# Patient Record
Sex: FEMALE | Race: WHITE | NOT HISPANIC OR LATINO | URBAN - METROPOLITAN AREA
[De-identification: names, ages, dates, MRNs, and addresses within clinical notes are randomized per-mention and may not be internally consistent; named-entity substitution may affect disease eponyms.]

---

## 2018-03-27 ENCOUNTER — OUTPATIENT (OUTPATIENT)
Dept: OUTPATIENT SERVICES | Facility: HOSPITAL | Age: 23
LOS: 1 days | End: 2018-03-27
Payer: COMMERCIAL

## 2018-03-27 DIAGNOSIS — Z3A.00 WEEKS OF GESTATION OF PREGNANCY NOT SPECIFIED: ICD-10-CM

## 2018-03-27 DIAGNOSIS — O26.899 OTHER SPECIFIED PREGNANCY RELATED CONDITIONS, UNSPECIFIED TRIMESTER: ICD-10-CM

## 2018-03-28 ENCOUNTER — OUTPATIENT (OUTPATIENT)
Dept: OUTPATIENT SERVICES | Facility: HOSPITAL | Age: 23
LOS: 1 days | End: 2018-03-28
Payer: COMMERCIAL

## 2018-03-28 DIAGNOSIS — O26.899 OTHER SPECIFIED PREGNANCY RELATED CONDITIONS, UNSPECIFIED TRIMESTER: ICD-10-CM

## 2018-03-28 DIAGNOSIS — Z3A.00 WEEKS OF GESTATION OF PREGNANCY NOT SPECIFIED: ICD-10-CM

## 2018-03-28 LAB — AMNISURE ROM (RUPTURE OF MEMBRANES): NEGATIVE — SIGNIFICANT CHANGE UP

## 2018-03-28 PROCEDURE — 84112 EVAL AMNIOTIC FLUID PROTEIN: CPT

## 2018-03-28 PROCEDURE — G0463: CPT

## 2018-03-28 PROCEDURE — 59025 FETAL NON-STRESS TEST: CPT

## 2018-04-09 PROBLEM — Z00.00 ENCOUNTER FOR PREVENTIVE HEALTH EXAMINATION: Status: ACTIVE | Noted: 2018-04-09

## 2018-04-15 ENCOUNTER — INPATIENT (INPATIENT)
Facility: HOSPITAL | Age: 23
LOS: 2 days | Discharge: HOME | End: 2018-04-18
Attending: OBSTETRICS & GYNECOLOGY | Admitting: OBSTETRICS & GYNECOLOGY
Payer: COMMERCIAL

## 2018-04-15 VITALS — WEIGHT: 123.46 LBS | HEIGHT: 65 IN

## 2018-04-15 DIAGNOSIS — Z34.80 ENCOUNTER FOR SUPERVISION OF OTHER NORMAL PREGNANCY, UNSPECIFIED TRIMESTER: ICD-10-CM

## 2018-04-15 DIAGNOSIS — O26.899 OTHER SPECIFIED PREGNANCY RELATED CONDITIONS, UNSPECIFIED TRIMESTER: ICD-10-CM

## 2018-04-15 DIAGNOSIS — Z3A.00 WEEKS OF GESTATION OF PREGNANCY NOT SPECIFIED: ICD-10-CM

## 2018-04-15 LAB
BASOPHILS # BLD AUTO: 0.1 K/UL — SIGNIFICANT CHANGE UP (ref 0–0.2)
BASOPHILS NFR BLD AUTO: 0.6 % — SIGNIFICANT CHANGE UP (ref 0–2)
BLD GP AB SCN SERPL QL: NEGATIVE — SIGNIFICANT CHANGE UP
EOSINOPHIL # BLD AUTO: 0.1 K/UL — SIGNIFICANT CHANGE UP (ref 0–0.5)
EOSINOPHIL NFR BLD AUTO: 0.9 % — SIGNIFICANT CHANGE UP (ref 0–6)
HCT VFR BLD CALC: 38.4 % — SIGNIFICANT CHANGE UP (ref 34.5–45)
HGB BLD-MCNC: 13.1 G/DL — SIGNIFICANT CHANGE UP (ref 11.5–15.5)
LYMPHOCYTES # BLD AUTO: 2.6 K/UL — SIGNIFICANT CHANGE UP (ref 1–3.3)
LYMPHOCYTES # BLD AUTO: 20.7 % — SIGNIFICANT CHANGE UP (ref 13–44)
MCHC RBC-ENTMCNC: 31 PG — SIGNIFICANT CHANGE UP (ref 27–34)
MCHC RBC-ENTMCNC: 34.1 GM/DL — SIGNIFICANT CHANGE UP (ref 32–36)
MCV RBC AUTO: 90.8 FL — SIGNIFICANT CHANGE UP (ref 80–100)
MONOCYTES # BLD AUTO: 1 K/UL — HIGH (ref 0–0.9)
MONOCYTES NFR BLD AUTO: 8 % — SIGNIFICANT CHANGE UP (ref 2–14)
NEUTROPHILS # BLD AUTO: 8.7 K/UL — HIGH (ref 1.8–7.4)
NEUTROPHILS NFR BLD AUTO: 69.9 % — SIGNIFICANT CHANGE UP (ref 43–77)
PLATELET # BLD AUTO: 328 K/UL — SIGNIFICANT CHANGE UP (ref 150–400)
RBC # BLD: 4.23 M/UL — SIGNIFICANT CHANGE UP (ref 3.8–5.2)
RBC # FLD: 11.7 % — SIGNIFICANT CHANGE UP (ref 10.3–14.5)
RH IG SCN BLD-IMP: POSITIVE — SIGNIFICANT CHANGE UP
WBC # BLD: 12.5 K/UL — HIGH (ref 3.8–10.5)
WBC # FLD AUTO: 12.5 K/UL — HIGH (ref 3.8–10.5)

## 2018-04-15 RX ORDER — SODIUM CHLORIDE 9 MG/ML
500 INJECTION, SOLUTION INTRAVENOUS ONCE
Qty: 0 | Refills: 0 | Status: DISCONTINUED | OUTPATIENT
Start: 2018-04-15 | End: 2018-04-16

## 2018-04-15 RX ORDER — OXYTOCIN 10 UNIT/ML
333.33 VIAL (ML) INJECTION
Qty: 20 | Refills: 0 | Status: DISCONTINUED | OUTPATIENT
Start: 2018-04-15 | End: 2018-04-16

## 2018-04-15 RX ORDER — SODIUM CHLORIDE 9 MG/ML
1000 INJECTION, SOLUTION INTRAVENOUS
Qty: 0 | Refills: 0 | Status: DISCONTINUED | OUTPATIENT
Start: 2018-04-15 | End: 2018-04-16

## 2018-04-15 RX ORDER — OXYTOCIN 10 UNIT/ML
4 VIAL (ML) INJECTION
Qty: 30 | Refills: 0 | Status: DISCONTINUED | OUTPATIENT
Start: 2018-04-15 | End: 2018-04-16

## 2018-04-15 RX ORDER — CITRIC ACID/SODIUM CITRATE 300-500 MG
15 SOLUTION, ORAL ORAL EVERY 4 HOURS
Qty: 0 | Refills: 0 | Status: DISCONTINUED | OUTPATIENT
Start: 2018-04-15 | End: 2018-04-16

## 2018-04-16 LAB — T PALLIDUM AB TITR SER: NEGATIVE — SIGNIFICANT CHANGE UP

## 2018-04-16 RX ORDER — KETOROLAC TROMETHAMINE 30 MG/ML
30 SYRINGE (ML) INJECTION ONCE
Qty: 0 | Refills: 0 | Status: DISCONTINUED | OUTPATIENT
Start: 2018-04-16 | End: 2018-04-16

## 2018-04-16 RX ORDER — DIBUCAINE 1 %
1 OINTMENT (GRAM) RECTAL EVERY 4 HOURS
Qty: 0 | Refills: 0 | Status: DISCONTINUED | OUTPATIENT
Start: 2018-04-16 | End: 2018-04-18

## 2018-04-16 RX ORDER — SODIUM CHLORIDE 9 MG/ML
3 INJECTION INTRAMUSCULAR; INTRAVENOUS; SUBCUTANEOUS EVERY 8 HOURS
Qty: 0 | Refills: 0 | Status: DISCONTINUED | OUTPATIENT
Start: 2018-04-16 | End: 2018-04-18

## 2018-04-16 RX ORDER — HYDROCORTISONE 1 %
1 OINTMENT (GRAM) TOPICAL EVERY 4 HOURS
Qty: 0 | Refills: 0 | Status: DISCONTINUED | OUTPATIENT
Start: 2018-04-16 | End: 2018-04-18

## 2018-04-16 RX ORDER — OXYCODONE HYDROCHLORIDE 5 MG/1
5 TABLET ORAL
Qty: 0 | Refills: 0 | Status: DISCONTINUED | OUTPATIENT
Start: 2018-04-16 | End: 2018-04-18

## 2018-04-16 RX ORDER — OXYTOCIN 10 UNIT/ML
41.67 VIAL (ML) INJECTION
Qty: 20 | Refills: 0 | Status: DISCONTINUED | OUTPATIENT
Start: 2018-04-16 | End: 2018-04-18

## 2018-04-16 RX ORDER — DIBUCAINE 1 %
1 OINTMENT (GRAM) RECTAL EVERY 4 HOURS
Qty: 0 | Refills: 0 | Status: DISCONTINUED | OUTPATIENT
Start: 2018-04-16 | End: 2018-04-16

## 2018-04-16 RX ORDER — ACETAMINOPHEN 500 MG
975 TABLET ORAL EVERY 6 HOURS
Qty: 0 | Refills: 0 | Status: DISCONTINUED | OUTPATIENT
Start: 2018-04-16 | End: 2018-04-18

## 2018-04-16 RX ORDER — OXYTOCIN 10 UNIT/ML
41.67 VIAL (ML) INJECTION
Qty: 20 | Refills: 0 | Status: DISCONTINUED | OUTPATIENT
Start: 2018-04-16 | End: 2018-04-16

## 2018-04-16 RX ORDER — GLYCERIN ADULT
1 SUPPOSITORY, RECTAL RECTAL AT BEDTIME
Qty: 0 | Refills: 0 | Status: DISCONTINUED | OUTPATIENT
Start: 2018-04-16 | End: 2018-04-18

## 2018-04-16 RX ORDER — IBUPROFEN 200 MG
600 TABLET ORAL EVERY 6 HOURS
Qty: 0 | Refills: 0 | Status: COMPLETED | OUTPATIENT
Start: 2018-04-16 | End: 2019-03-15

## 2018-04-16 RX ORDER — PRAMOXINE HYDROCHLORIDE 150 MG/15G
1 AEROSOL, FOAM RECTAL EVERY 4 HOURS
Qty: 0 | Refills: 0 | Status: DISCONTINUED | OUTPATIENT
Start: 2018-04-16 | End: 2018-04-18

## 2018-04-16 RX ORDER — DIPHENHYDRAMINE HCL 50 MG
25 CAPSULE ORAL EVERY 6 HOURS
Qty: 0 | Refills: 0 | Status: DISCONTINUED | OUTPATIENT
Start: 2018-04-16 | End: 2018-04-18

## 2018-04-16 RX ORDER — MAGNESIUM HYDROXIDE 400 MG/1
30 TABLET, CHEWABLE ORAL
Qty: 0 | Refills: 0 | Status: DISCONTINUED | OUTPATIENT
Start: 2018-04-16 | End: 2018-04-18

## 2018-04-16 RX ORDER — LANOLIN
1 OINTMENT (GRAM) TOPICAL EVERY 6 HOURS
Qty: 0 | Refills: 0 | Status: DISCONTINUED | OUTPATIENT
Start: 2018-04-16 | End: 2018-04-18

## 2018-04-16 RX ORDER — PRAMOXINE HYDROCHLORIDE 150 MG/15G
1 AEROSOL, FOAM RECTAL EVERY 4 HOURS
Qty: 0 | Refills: 0 | Status: DISCONTINUED | OUTPATIENT
Start: 2018-04-16 | End: 2018-04-16

## 2018-04-16 RX ORDER — HYDROCORTISONE 1 %
1 OINTMENT (GRAM) TOPICAL EVERY 4 HOURS
Qty: 0 | Refills: 0 | Status: DISCONTINUED | OUTPATIENT
Start: 2018-04-16 | End: 2018-04-16

## 2018-04-16 RX ORDER — IBUPROFEN 200 MG
600 TABLET ORAL EVERY 6 HOURS
Qty: 0 | Refills: 0 | Status: DISCONTINUED | OUTPATIENT
Start: 2018-04-16 | End: 2018-04-18

## 2018-04-16 RX ORDER — OXYCODONE HYDROCHLORIDE 5 MG/1
5 TABLET ORAL EVERY 4 HOURS
Qty: 0 | Refills: 0 | Status: DISCONTINUED | OUTPATIENT
Start: 2018-04-16 | End: 2018-04-18

## 2018-04-16 RX ORDER — SODIUM CHLORIDE 9 MG/ML
3 INJECTION INTRAMUSCULAR; INTRAVENOUS; SUBCUTANEOUS EVERY 8 HOURS
Qty: 0 | Refills: 0 | Status: DISCONTINUED | OUTPATIENT
Start: 2018-04-16 | End: 2018-04-16

## 2018-04-16 RX ORDER — TETANUS TOXOID, REDUCED DIPHTHERIA TOXOID AND ACELLULAR PERTUSSIS VACCINE, ADSORBED 5; 2.5; 8; 8; 2.5 [IU]/.5ML; [IU]/.5ML; UG/.5ML; UG/.5ML; UG/.5ML
0.5 SUSPENSION INTRAMUSCULAR ONCE
Qty: 0 | Refills: 0 | Status: DISCONTINUED | OUTPATIENT
Start: 2018-04-16 | End: 2018-04-18

## 2018-04-16 RX ORDER — DOCUSATE SODIUM 100 MG
100 CAPSULE ORAL
Qty: 0 | Refills: 0 | Status: DISCONTINUED | OUTPATIENT
Start: 2018-04-16 | End: 2018-04-18

## 2018-04-16 RX ORDER — AER TRAVELER 0.5 G/1
1 SOLUTION RECTAL; TOPICAL EVERY 4 HOURS
Qty: 0 | Refills: 0 | Status: DISCONTINUED | OUTPATIENT
Start: 2018-04-16 | End: 2018-04-18

## 2018-04-16 RX ORDER — ACETAMINOPHEN 500 MG
975 TABLET ORAL EVERY 6 HOURS
Qty: 0 | Refills: 0 | Status: COMPLETED | OUTPATIENT
Start: 2018-04-16 | End: 2019-03-15

## 2018-04-16 RX ORDER — SIMETHICONE 80 MG/1
80 TABLET, CHEWABLE ORAL EVERY 6 HOURS
Qty: 0 | Refills: 0 | Status: DISCONTINUED | OUTPATIENT
Start: 2018-04-16 | End: 2018-04-18

## 2018-04-16 RX ORDER — AER TRAVELER 0.5 G/1
1 SOLUTION RECTAL; TOPICAL EVERY 4 HOURS
Qty: 0 | Refills: 0 | Status: DISCONTINUED | OUTPATIENT
Start: 2018-04-16 | End: 2018-04-16

## 2018-04-16 RX ADMIN — Medication 125 MILLIUNIT(S)/MIN: at 01:37

## 2018-04-16 RX ADMIN — Medication 975 MILLIGRAM(S): at 06:00

## 2018-04-16 RX ADMIN — Medication 975 MILLIGRAM(S): at 07:05

## 2018-04-16 RX ADMIN — SODIUM CHLORIDE 3 MILLILITER(S): 9 INJECTION INTRAMUSCULAR; INTRAVENOUS; SUBCUTANEOUS at 14:39

## 2018-04-16 RX ADMIN — Medication 975 MILLIGRAM(S): at 12:30

## 2018-04-16 RX ADMIN — Medication 30 MILLIGRAM(S): at 02:54

## 2018-04-16 RX ADMIN — Medication 600 MILLIGRAM(S): at 09:00

## 2018-04-16 RX ADMIN — Medication 975 MILLIGRAM(S): at 11:42

## 2018-04-16 RX ADMIN — Medication 600 MILLIGRAM(S): at 15:15

## 2018-04-16 RX ADMIN — Medication 975 MILLIGRAM(S): at 17:18

## 2018-04-16 RX ADMIN — Medication 600 MILLIGRAM(S): at 14:33

## 2018-04-16 RX ADMIN — Medication 600 MILLIGRAM(S): at 08:14

## 2018-04-16 RX ADMIN — Medication 975 MILLIGRAM(S): at 18:05

## 2018-04-16 RX ADMIN — Medication 1 TABLET(S): at 08:14

## 2018-04-17 LAB
HCT VFR BLD CALC: 31.3 % — LOW (ref 34.5–45)
HGB BLD-MCNC: 10.2 G/DL — LOW (ref 11.5–15.5)

## 2018-04-17 RX ADMIN — Medication 600 MILLIGRAM(S): at 18:29

## 2018-04-17 RX ADMIN — Medication 975 MILLIGRAM(S): at 23:55

## 2018-04-17 RX ADMIN — Medication 600 MILLIGRAM(S): at 12:30

## 2018-04-17 RX ADMIN — Medication 975 MILLIGRAM(S): at 23:23

## 2018-04-17 RX ADMIN — Medication 975 MILLIGRAM(S): at 04:13

## 2018-04-17 RX ADMIN — Medication 1 TABLET(S): at 11:51

## 2018-04-17 RX ADMIN — Medication 600 MILLIGRAM(S): at 19:10

## 2018-04-17 RX ADMIN — Medication 975 MILLIGRAM(S): at 12:30

## 2018-04-17 RX ADMIN — Medication 975 MILLIGRAM(S): at 11:53

## 2018-04-17 RX ADMIN — Medication 600 MILLIGRAM(S): at 11:52

## 2018-04-17 RX ADMIN — Medication 975 MILLIGRAM(S): at 04:43

## 2018-04-17 NOTE — PROGRESS NOTE ADULT - SUBJECTIVE AND OBJECTIVE BOX
OB Progress Note:  PPD#1    S: 21yo PPD#1 s/p . Patient feels well. Pain is well controlled. She is tolerating a regular diet and passing flatus. She is voiding spontaneously, and ambulating without difficulty. Denies CP/SOB. Denies lightheadedness/dizziness. Denies N/V.    O:  Vitals:  Vital Signs Last 24 Hrs  T(C): 36.6 (2018 17:44), Max: 36.7 (2018 05:30)  T(F): 97.9 (2018 17:44), Max: 98.1 (2018 05:30)  HR: 78 (2018 17:44) (72 - 83)  BP: 102/66 (2018 17:44) (102/66 - 120/76)  BP(mean): --  RR: 18 (2018 17:44) (18 - 18)  SpO2: 98% (2018 17:44) (96% - 98%)    MEDICATIONS  (STANDING):  acetaminophen   Tablet. 975 milliGRAM(s) Oral every 6 hours  diphtheria/tetanus/pertussis (acellular) Vaccine (ADAcel) 0.5 milliLiter(s) IntraMuscular once  ibuprofen  Tablet 600 milliGRAM(s) Oral every 6 hours  oxyCODONE    IR 5 milliGRAM(s) Oral every 3 hours  oxytocin Infusion 41.667 milliUNIT(s)/Min (125 mL/Hr) IV Continuous <Continuous>  prenatal multivitamin 1 Tablet(s) Oral daily  sodium chloride 0.9% lock flush 3 milliLiter(s) IV Push every 8 hours      Labs:  Blood type: O Positive  Rubella IgG: RPR: Negative                          13.1   12.5<H> >-----------< 328    ( 15 @ 23:07 )             38.4                  Physical Exam:  General: NAD  Abdomen: soft, non-tender, non-distended, fundus firm  Vaginal: Lochia wnl  Extremities: No erythema/edema

## 2018-04-17 NOTE — PROGRESS NOTE ADULT - SUBJECTIVE AND OBJECTIVE BOX
ANESTHESIA POSTOP CHECK    22y Female POSTOP DAY 1 S/P Labor Epi for     Vital Signs Last 24 Hrs  T(C): 36.7 (2018 05:00), Max: 36.7 (2018 05:00)  T(F): 98.1 (2018 05:00), Max: 98.1 (2018 05:00)  HR: 85 (2018 05:00) (72 - 85)  BP: 111/69 (2018 05:00) (102/66 - 111/69)  BP(mean): --  RR: 18 (2018 05:00) (18 - 18)  SpO2: 98% (2018 17:44) (96% - 98%)  I&O's Summary    2018 07:01  -  2018 07:00  --------------------------------------------------------  IN: 0 mL / OUT: 800 mL / NET: -800 mL        [x ] NO APPARENT ANESTHESIA COMPLICATIONS      Comments: Pt denies HA, N/V, weakness/paresthesias.  Pain well controlled.

## 2018-04-17 NOTE — PROGRESS NOTE ADULT - PROBLEM SELECTOR PLAN 1
- Pain well controlled, continue current pain regimen  - Increase ambulation, SCDs when not ambulating  - Continue regular diet    Aruna Puri PGY-1

## 2018-04-17 NOTE — PROGRESS NOTE ADULT - SUBJECTIVE AND OBJECTIVE BOX
VS: Vital Signs Last 24 Hrs  T(C): 36.7 (17 Apr 2018 05:00), Max: 36.7 (16 Apr 2018 09:00)  T(F): 98.1 (17 Apr 2018 05:00), Max: 98.1 (17 Apr 2018 05:00)  HR: 85 (17 Apr 2018 05:00) (72 - 85)  BP: 111/69 (17 Apr 2018 05:00) (102/66 - 120/76)  BP(mean): --  RR: 18 (17 Apr 2018 05:00) (18 - 18)  SpO2: 98% (16 Apr 2018 17:44) (96% - 98%)    Abdomen soft, fundus firm  Lochia WNL  Voiding, stable

## 2018-04-18 ENCOUNTER — TRANSCRIPTION ENCOUNTER (OUTPATIENT)
Age: 23
End: 2018-04-18

## 2018-04-18 VITALS
RESPIRATION RATE: 18 BRPM | SYSTOLIC BLOOD PRESSURE: 114 MMHG | DIASTOLIC BLOOD PRESSURE: 75 MMHG | TEMPERATURE: 98 F | HEART RATE: 75 BPM

## 2018-04-18 PROCEDURE — G0463: CPT

## 2018-04-18 PROCEDURE — 86850 RBC ANTIBODY SCREEN: CPT

## 2018-04-18 PROCEDURE — 86901 BLOOD TYPING SEROLOGIC RH(D): CPT

## 2018-04-18 PROCEDURE — 85027 COMPLETE CBC AUTOMATED: CPT

## 2018-04-18 PROCEDURE — 59025 FETAL NON-STRESS TEST: CPT

## 2018-04-18 PROCEDURE — 86780 TREPONEMA PALLIDUM: CPT

## 2018-04-18 PROCEDURE — 86900 BLOOD TYPING SEROLOGIC ABO: CPT

## 2018-04-18 PROCEDURE — 85018 HEMOGLOBIN: CPT

## 2018-04-18 PROCEDURE — 59050 FETAL MONITOR W/REPORT: CPT

## 2018-04-18 RX ADMIN — Medication 600 MILLIGRAM(S): at 04:38

## 2018-04-18 RX ADMIN — Medication 600 MILLIGRAM(S): at 11:09

## 2018-04-18 RX ADMIN — Medication 600 MILLIGRAM(S): at 05:10

## 2018-04-18 RX ADMIN — Medication 600 MILLIGRAM(S): at 12:00

## 2018-04-18 RX ADMIN — Medication 1 TABLET(S): at 11:09

## 2018-04-18 NOTE — DISCHARGE NOTE OB - CARE PROVIDER_API CALL
Caridad Villatoro), Obstetrics and Gynecology  [address]  [address]  [place] [address]  Phone: [phone]  Fax: [phone]

## 2018-04-18 NOTE — DISCHARGE NOTE OB - CARE PLAN
Principal Discharge DX:	 (normal spontaneous vaginal delivery)  Goal:	Recovery  Assessment and plan of treatment:	Regular diet, activity as tolerated, follow up with MD in 4 weeks.

## 2018-04-18 NOTE — DISCHARGE NOTE OB - MATERIALS PROVIDED
Breastfeeding Mother’s Support Group Information/Discharge Medication Information for Patients and Families Pocket Guide/Shaken Baby Prevention Handout/Breastfeeding Guide and Packet/Birth Certificate Instructions/Breastfeeding Log/[facility]  Screening Program

## 2018-04-18 NOTE — DISCHARGE NOTE OB - PATIENT PORTAL LINK FT
You can access the [facility] Portal, offered by [facility], by registering with the following website: [email or web address]

## 2018-04-18 NOTE — PROGRESS NOTE ADULT - SUBJECTIVE AND OBJECTIVE BOX
VS: Vital Signs Last 24 Hrs  T(C): 36.6 (18 Apr 2018 05:00), Max: 36.7 (17 Apr 2018 16:50)  T(F): 97.9 (18 Apr 2018 05:00), Max: 98 (17 Apr 2018 16:50)  HR: 75 (18 Apr 2018 05:00) (73 - 75)  BP: 114/75 (18 Apr 2018 05:00) (113/73 - 114/75)  BP(mean): --  RR: 18 (18 Apr 2018 05:00) (18 - 18)  SpO2: --    Abdomen soft, fundus firm  Lochia WNL  Voiding, stable

## 2018-04-19 ENCOUNTER — APPOINTMENT (OUTPATIENT)
Dept: ANTEPARTUM | Facility: CLINIC | Age: 23
End: 2018-04-19

## 2019-05-07 ENCOUNTER — RESULT REVIEW (OUTPATIENT)
Age: 24
End: 2019-05-07

## 2019-08-12 ENCOUNTER — APPOINTMENT (OUTPATIENT)
Dept: ANTEPARTUM | Facility: CLINIC | Age: 24
End: 2019-08-12
Payer: COMMERCIAL

## 2019-08-12 ENCOUNTER — ASOB RESULT (OUTPATIENT)
Age: 24
End: 2019-08-12

## 2019-08-12 PROCEDURE — 76811 OB US DETAILED SNGL FETUS: CPT

## 2019-08-12 PROCEDURE — 76817 TRANSVAGINAL US OBSTETRIC: CPT

## 2019-10-29 ENCOUNTER — RX RENEWAL (OUTPATIENT)
Age: 24
End: 2019-10-29

## 2019-10-29 ENCOUNTER — APPOINTMENT (OUTPATIENT)
Dept: ANTEPARTUM | Facility: CLINIC | Age: 24
End: 2019-10-29
Payer: COMMERCIAL

## 2019-10-29 ENCOUNTER — APPOINTMENT (OUTPATIENT)
Dept: OTOLARYNGOLOGY | Facility: CLINIC | Age: 24
End: 2019-10-29

## 2019-10-29 ENCOUNTER — ASOB RESULT (OUTPATIENT)
Age: 24
End: 2019-10-29

## 2019-10-29 DIAGNOSIS — K12.0 RECURRENT ORAL APHTHAE: ICD-10-CM

## 2019-10-29 PROCEDURE — 76816 OB US FOLLOW-UP PER FETUS: CPT

## 2019-10-29 PROCEDURE — 76819 FETAL BIOPHYS PROFIL W/O NST: CPT

## 2019-10-29 PROCEDURE — 76820 UMBILICAL ARTERY ECHO: CPT

## 2019-10-29 RX ORDER — FAMCICLOVIR 250 MG/1
250 TABLET, FILM COATED ORAL
Qty: 20 | Refills: 1 | Status: ACTIVE | COMMUNITY
Start: 2019-10-29 | End: 1900-01-01

## 2019-10-29 RX ORDER — METHYLPREDNISOLONE 4 MG/1
4 TABLET ORAL
Qty: 1 | Refills: 1 | Status: ACTIVE | COMMUNITY
Start: 2019-10-29 | End: 1900-01-01

## 2019-10-31 ENCOUNTER — OUTPATIENT (OUTPATIENT)
Dept: OUTPATIENT SERVICES | Facility: HOSPITAL | Age: 24
LOS: 1 days | End: 2019-10-31
Payer: COMMERCIAL

## 2019-10-31 DIAGNOSIS — O26.899 OTHER SPECIFIED PREGNANCY RELATED CONDITIONS, UNSPECIFIED TRIMESTER: ICD-10-CM

## 2019-10-31 DIAGNOSIS — Z3A.00 WEEKS OF GESTATION OF PREGNANCY NOT SPECIFIED: ICD-10-CM

## 2019-10-31 LAB
ALBUMIN SERPL ELPH-MCNC: 3.3 G/DL — SIGNIFICANT CHANGE UP (ref 3.3–5)
ALP SERPL-CCNC: 108 U/L — SIGNIFICANT CHANGE UP (ref 40–120)
ALT FLD-CCNC: 16 U/L — SIGNIFICANT CHANGE UP (ref 10–45)
AMYLASE P1 CFR SERPL: 95 U/L — SIGNIFICANT CHANGE UP (ref 25–125)
ANION GAP SERPL CALC-SCNC: 7 MMOL/L — SIGNIFICANT CHANGE UP (ref 5–17)
APPEARANCE UR: CLEAR — SIGNIFICANT CHANGE UP
AST SERPL-CCNC: 14 U/L — SIGNIFICANT CHANGE UP (ref 10–40)
BILIRUB SERPL-MCNC: 0.2 MG/DL — SIGNIFICANT CHANGE UP (ref 0.2–1.2)
BILIRUB UR-MCNC: NEGATIVE — SIGNIFICANT CHANGE UP
BUN SERPL-MCNC: 7 MG/DL — SIGNIFICANT CHANGE UP (ref 7–23)
CALCIUM SERPL-MCNC: 8.6 MG/DL — SIGNIFICANT CHANGE UP (ref 8.4–10.5)
CHLORIDE SERPL-SCNC: 105 MMOL/L — SIGNIFICANT CHANGE UP (ref 96–108)
CO2 SERPL-SCNC: 23 MMOL/L — SIGNIFICANT CHANGE UP (ref 22–31)
COLOR SPEC: COLORLESS — SIGNIFICANT CHANGE UP
CREAT SERPL-MCNC: 0.46 MG/DL — LOW (ref 0.5–1.3)
DIFF PNL FLD: NEGATIVE — SIGNIFICANT CHANGE UP
GLUCOSE SERPL-MCNC: 80 MG/DL — SIGNIFICANT CHANGE UP (ref 70–99)
GLUCOSE UR QL: NEGATIVE — SIGNIFICANT CHANGE UP
HCT VFR BLD CALC: 28.6 % — LOW (ref 34.5–45)
HGB BLD-MCNC: 9.6 G/DL — LOW (ref 11.5–15.5)
KETONES UR-MCNC: NEGATIVE — SIGNIFICANT CHANGE UP
LEUKOCYTE ESTERASE UR-ACNC: NEGATIVE — SIGNIFICANT CHANGE UP
LIDOCAIN IGE QN: 24 U/L — SIGNIFICANT CHANGE UP (ref 7–60)
MCHC RBC-ENTMCNC: 30 PG — SIGNIFICANT CHANGE UP (ref 27–34)
MCHC RBC-ENTMCNC: 33.6 GM/DL — SIGNIFICANT CHANGE UP (ref 32–36)
MCV RBC AUTO: 89.4 FL — SIGNIFICANT CHANGE UP (ref 80–100)
NITRITE UR-MCNC: NEGATIVE — SIGNIFICANT CHANGE UP
NRBC # BLD: 0 /100 WBCS — SIGNIFICANT CHANGE UP (ref 0–0)
PH UR: 7 — SIGNIFICANT CHANGE UP (ref 5–8)
PLATELET # BLD AUTO: 354 K/UL — SIGNIFICANT CHANGE UP (ref 150–400)
POTASSIUM SERPL-MCNC: 3.6 MMOL/L — SIGNIFICANT CHANGE UP (ref 3.5–5.3)
POTASSIUM SERPL-SCNC: 3.6 MMOL/L — SIGNIFICANT CHANGE UP (ref 3.5–5.3)
PROT SERPL-MCNC: 6.2 G/DL — SIGNIFICANT CHANGE UP (ref 6–8.3)
PROT UR-MCNC: NEGATIVE — SIGNIFICANT CHANGE UP
RBC # BLD: 3.2 M/UL — LOW (ref 3.8–5.2)
RBC # FLD: 13.3 % — SIGNIFICANT CHANGE UP (ref 10.3–14.5)
SODIUM SERPL-SCNC: 135 MMOL/L — SIGNIFICANT CHANGE UP (ref 135–145)
SP GR SPEC: 1 — LOW (ref 1.01–1.02)
UROBILINOGEN FLD QL: NEGATIVE — SIGNIFICANT CHANGE UP
WBC # BLD: 15.93 K/UL — HIGH (ref 3.8–10.5)
WBC # FLD AUTO: 15.93 K/UL — HIGH (ref 3.8–10.5)

## 2019-10-31 PROCEDURE — 82150 ASSAY OF AMYLASE: CPT

## 2019-10-31 PROCEDURE — 87086 URINE CULTURE/COLONY COUNT: CPT

## 2019-10-31 PROCEDURE — 83690 ASSAY OF LIPASE: CPT

## 2019-10-31 PROCEDURE — 85027 COMPLETE CBC AUTOMATED: CPT

## 2019-10-31 PROCEDURE — G0463: CPT

## 2019-10-31 PROCEDURE — 76770 US EXAM ABDO BACK WALL COMP: CPT

## 2019-10-31 PROCEDURE — 80053 COMPREHEN METABOLIC PANEL: CPT

## 2019-10-31 PROCEDURE — 81003 URINALYSIS AUTO W/O SCOPE: CPT

## 2019-10-31 PROCEDURE — 76770 US EXAM ABDO BACK WALL COMP: CPT | Mod: 26

## 2019-10-31 PROCEDURE — 59025 FETAL NON-STRESS TEST: CPT

## 2019-10-31 RX ORDER — MORPHINE SULFATE 50 MG/1
2 CAPSULE, EXTENDED RELEASE ORAL EVERY 4 HOURS
Refills: 0 | Status: DISCONTINUED | OUTPATIENT
Start: 2019-10-31 | End: 2019-10-31

## 2019-10-31 RX ORDER — ACETAMINOPHEN 500 MG
1000 TABLET ORAL EVERY 6 HOURS
Refills: 0 | Status: COMPLETED | OUTPATIENT
Start: 2019-10-31 | End: 2019-10-31

## 2019-10-31 RX ORDER — SODIUM CHLORIDE 9 MG/ML
1000 INJECTION, SOLUTION INTRAVENOUS ONCE
Refills: 0 | Status: DISCONTINUED | OUTPATIENT
Start: 2019-10-31 | End: 2019-11-17

## 2019-10-31 RX ORDER — SODIUM CHLORIDE 9 MG/ML
1000 INJECTION, SOLUTION INTRAVENOUS
Refills: 0 | Status: DISCONTINUED | OUTPATIENT
Start: 2019-10-31 | End: 2019-11-17

## 2019-10-31 RX ORDER — TAMSULOSIN HYDROCHLORIDE 0.4 MG/1
1 CAPSULE ORAL
Qty: 14 | Refills: 0
Start: 2019-10-31 | End: 2019-11-13

## 2019-10-31 RX ORDER — TAMSULOSIN HYDROCHLORIDE 0.4 MG/1
0.4 CAPSULE ORAL AT BEDTIME
Refills: 0 | Status: DISCONTINUED | OUTPATIENT
Start: 2019-10-31 | End: 2019-11-17

## 2019-10-31 RX ADMIN — TAMSULOSIN HYDROCHLORIDE 0.4 MILLIGRAM(S): 0.4 CAPSULE ORAL at 23:21

## 2019-10-31 RX ADMIN — MORPHINE SULFATE 2 MILLIGRAM(S): 50 CAPSULE, EXTENDED RELEASE ORAL at 22:25

## 2019-10-31 RX ADMIN — Medication 400 MILLIGRAM(S): at 22:20

## 2019-11-02 LAB
CULTURE RESULTS: SIGNIFICANT CHANGE UP
SPECIMEN SOURCE: SIGNIFICANT CHANGE UP

## 2019-11-04 ENCOUNTER — APPOINTMENT (OUTPATIENT)
Dept: ANTEPARTUM | Facility: CLINIC | Age: 24
End: 2019-11-04
Payer: COMMERCIAL

## 2019-11-04 ENCOUNTER — ASOB RESULT (OUTPATIENT)
Age: 24
End: 2019-11-04

## 2019-11-04 PROCEDURE — 99212 OFFICE O/P EST SF 10 MIN: CPT | Mod: 25

## 2019-11-04 PROCEDURE — 76820 UMBILICAL ARTERY ECHO: CPT

## 2019-11-04 PROCEDURE — 76819 FETAL BIOPHYS PROFIL W/O NST: CPT

## 2019-11-07 ENCOUNTER — OUTPATIENT (OUTPATIENT)
Dept: OUTPATIENT SERVICES | Facility: HOSPITAL | Age: 24
LOS: 1 days | End: 2019-11-07
Payer: COMMERCIAL

## 2019-11-07 ENCOUNTER — APPOINTMENT (OUTPATIENT)
Dept: ULTRASOUND IMAGING | Facility: IMAGING CENTER | Age: 24
End: 2019-11-07
Payer: COMMERCIAL

## 2019-11-07 DIAGNOSIS — N13.30 UNSPECIFIED HYDRONEPHROSIS: ICD-10-CM

## 2019-11-07 PROCEDURE — 76770 US EXAM ABDO BACK WALL COMP: CPT

## 2019-11-07 PROCEDURE — 76770 US EXAM ABDO BACK WALL COMP: CPT | Mod: 26

## 2019-11-13 ENCOUNTER — ASOB RESULT (OUTPATIENT)
Age: 24
End: 2019-11-13

## 2019-11-13 ENCOUNTER — APPOINTMENT (OUTPATIENT)
Dept: ANTEPARTUM | Facility: CLINIC | Age: 24
End: 2019-11-13
Payer: COMMERCIAL

## 2019-11-13 PROCEDURE — 76816 OB US FOLLOW-UP PER FETUS: CPT

## 2019-11-26 ENCOUNTER — APPOINTMENT (OUTPATIENT)
Dept: ANTEPARTUM | Facility: CLINIC | Age: 24
End: 2019-11-26
Payer: COMMERCIAL

## 2019-11-26 ENCOUNTER — ASOB RESULT (OUTPATIENT)
Age: 24
End: 2019-11-26

## 2019-11-26 PROCEDURE — 76816 OB US FOLLOW-UP PER FETUS: CPT

## 2019-12-10 ENCOUNTER — ASOB RESULT (OUTPATIENT)
Age: 24
End: 2019-12-10

## 2019-12-10 ENCOUNTER — APPOINTMENT (OUTPATIENT)
Dept: ANTEPARTUM | Facility: CLINIC | Age: 24
End: 2019-12-10
Payer: COMMERCIAL

## 2019-12-10 PROCEDURE — 76816 OB US FOLLOW-UP PER FETUS: CPT

## 2019-12-22 ENCOUNTER — INPATIENT (INPATIENT)
Facility: HOSPITAL | Age: 24
LOS: 1 days | Discharge: HOME | End: 2019-12-24
Attending: OBSTETRICS & GYNECOLOGY | Admitting: OBSTETRICS & GYNECOLOGY
Payer: COMMERCIAL

## 2019-12-22 VITALS — WEIGHT: 114.64 LBS | HEIGHT: 65 IN

## 2019-12-22 DIAGNOSIS — Z3A.00 WEEKS OF GESTATION OF PREGNANCY NOT SPECIFIED: ICD-10-CM

## 2019-12-22 DIAGNOSIS — O26.899 OTHER SPECIFIED PREGNANCY RELATED CONDITIONS, UNSPECIFIED TRIMESTER: ICD-10-CM

## 2019-12-22 DIAGNOSIS — Z34.80 ENCOUNTER FOR SUPERVISION OF OTHER NORMAL PREGNANCY, UNSPECIFIED TRIMESTER: ICD-10-CM

## 2019-12-22 LAB
BASOPHILS # BLD AUTO: 0.05 K/UL — SIGNIFICANT CHANGE UP (ref 0–0.2)
BASOPHILS NFR BLD AUTO: 0.3 % — SIGNIFICANT CHANGE UP (ref 0–2)
BLD GP AB SCN SERPL QL: NEGATIVE — SIGNIFICANT CHANGE UP
EOSINOPHIL # BLD AUTO: 0.07 K/UL — SIGNIFICANT CHANGE UP (ref 0–0.5)
EOSINOPHIL NFR BLD AUTO: 0.5 % — SIGNIFICANT CHANGE UP (ref 0–6)
HCT VFR BLD CALC: 39.8 % — SIGNIFICANT CHANGE UP (ref 34.5–45)
HGB BLD-MCNC: 13.1 G/DL — SIGNIFICANT CHANGE UP (ref 11.5–15.5)
IMM GRANULOCYTES NFR BLD AUTO: 0.9 % — SIGNIFICANT CHANGE UP (ref 0–1.5)
LYMPHOCYTES # BLD AUTO: 20.7 % — SIGNIFICANT CHANGE UP (ref 13–44)
LYMPHOCYTES # BLD AUTO: 3.04 K/UL — SIGNIFICANT CHANGE UP (ref 1–3.3)
MCHC RBC-ENTMCNC: 29.6 PG — SIGNIFICANT CHANGE UP (ref 27–34)
MCHC RBC-ENTMCNC: 32.9 GM/DL — SIGNIFICANT CHANGE UP (ref 32–36)
MCV RBC AUTO: 90 FL — SIGNIFICANT CHANGE UP (ref 80–100)
MONOCYTES # BLD AUTO: 0.91 K/UL — HIGH (ref 0–0.9)
MONOCYTES NFR BLD AUTO: 6.2 % — SIGNIFICANT CHANGE UP (ref 2–14)
NEUTROPHILS # BLD AUTO: 10.48 K/UL — HIGH (ref 1.8–7.4)
NEUTROPHILS NFR BLD AUTO: 71.4 % — SIGNIFICANT CHANGE UP (ref 43–77)
NRBC # BLD: 0 /100 WBCS — SIGNIFICANT CHANGE UP (ref 0–0)
PLATELET # BLD AUTO: 351 K/UL — SIGNIFICANT CHANGE UP (ref 150–400)
RBC # BLD: 4.42 M/UL — SIGNIFICANT CHANGE UP (ref 3.8–5.2)
RBC # FLD: 13.2 % — SIGNIFICANT CHANGE UP (ref 10.3–14.5)
RH IG SCN BLD-IMP: POSITIVE — SIGNIFICANT CHANGE UP
WBC # BLD: 14.68 K/UL — HIGH (ref 3.8–10.5)
WBC # FLD AUTO: 14.68 K/UL — HIGH (ref 3.8–10.5)

## 2019-12-22 RX ORDER — SODIUM CHLORIDE 9 MG/ML
3 INJECTION INTRAMUSCULAR; INTRAVENOUS; SUBCUTANEOUS EVERY 8 HOURS
Refills: 0 | Status: DISCONTINUED | OUTPATIENT
Start: 2019-12-22 | End: 2019-12-24

## 2019-12-22 RX ORDER — AER TRAVELER 0.5 G/1
1 SOLUTION RECTAL; TOPICAL EVERY 4 HOURS
Refills: 0 | Status: DISCONTINUED | OUTPATIENT
Start: 2019-12-22 | End: 2019-12-24

## 2019-12-22 RX ORDER — DIPHENHYDRAMINE HCL 50 MG
25 CAPSULE ORAL EVERY 6 HOURS
Refills: 0 | Status: DISCONTINUED | OUTPATIENT
Start: 2019-12-22 | End: 2019-12-24

## 2019-12-22 RX ORDER — SODIUM CHLORIDE 9 MG/ML
1000 INJECTION, SOLUTION INTRAVENOUS
Refills: 0 | Status: DISCONTINUED | OUTPATIENT
Start: 2019-12-22 | End: 2019-12-22

## 2019-12-22 RX ORDER — GLYCERIN ADULT
1 SUPPOSITORY, RECTAL RECTAL AT BEDTIME
Refills: 0 | Status: DISCONTINUED | OUTPATIENT
Start: 2019-12-22 | End: 2019-12-24

## 2019-12-22 RX ORDER — BENZOCAINE 10 %
1 GEL (GRAM) MUCOUS MEMBRANE EVERY 6 HOURS
Refills: 0 | Status: DISCONTINUED | OUTPATIENT
Start: 2019-12-22 | End: 2019-12-24

## 2019-12-22 RX ORDER — AMPICILLIN TRIHYDRATE 250 MG
2 CAPSULE ORAL ONCE
Refills: 0 | Status: COMPLETED | OUTPATIENT
Start: 2019-12-22 | End: 2019-12-22

## 2019-12-22 RX ORDER — SIMETHICONE 80 MG/1
80 TABLET, CHEWABLE ORAL EVERY 4 HOURS
Refills: 0 | Status: DISCONTINUED | OUTPATIENT
Start: 2019-12-22 | End: 2019-12-24

## 2019-12-22 RX ORDER — PRAMOXINE HYDROCHLORIDE 150 MG/15G
1 AEROSOL, FOAM RECTAL EVERY 4 HOURS
Refills: 0 | Status: DISCONTINUED | OUTPATIENT
Start: 2019-12-22 | End: 2019-12-24

## 2019-12-22 RX ORDER — TETANUS TOXOID, REDUCED DIPHTHERIA TOXOID AND ACELLULAR PERTUSSIS VACCINE, ADSORBED 5; 2.5; 8; 8; 2.5 [IU]/.5ML; [IU]/.5ML; UG/.5ML; UG/.5ML; UG/.5ML
0.5 SUSPENSION INTRAMUSCULAR ONCE
Refills: 0 | Status: COMPLETED | OUTPATIENT
Start: 2019-12-22

## 2019-12-22 RX ORDER — KETOROLAC TROMETHAMINE 30 MG/ML
30 SYRINGE (ML) INJECTION ONCE
Refills: 0 | Status: DISCONTINUED | OUTPATIENT
Start: 2019-12-22 | End: 2019-12-22

## 2019-12-22 RX ORDER — ACETAMINOPHEN 500 MG
975 TABLET ORAL
Refills: 0 | Status: DISCONTINUED | OUTPATIENT
Start: 2019-12-22 | End: 2019-12-24

## 2019-12-22 RX ORDER — OXYCODONE HYDROCHLORIDE 5 MG/1
5 TABLET ORAL ONCE
Refills: 0 | Status: DISCONTINUED | OUTPATIENT
Start: 2019-12-22 | End: 2019-12-24

## 2019-12-22 RX ORDER — IBUPROFEN 200 MG
600 TABLET ORAL EVERY 6 HOURS
Refills: 0 | Status: DISCONTINUED | OUTPATIENT
Start: 2019-12-22 | End: 2019-12-24

## 2019-12-22 RX ORDER — CITRIC ACID/SODIUM CITRATE 300-500 MG
15 SOLUTION, ORAL ORAL EVERY 6 HOURS
Refills: 0 | Status: DISCONTINUED | OUTPATIENT
Start: 2019-12-22 | End: 2019-12-22

## 2019-12-22 RX ORDER — OXYCODONE HYDROCHLORIDE 5 MG/1
5 TABLET ORAL
Refills: 0 | Status: DISCONTINUED | OUTPATIENT
Start: 2019-12-22 | End: 2019-12-24

## 2019-12-22 RX ORDER — IBUPROFEN 200 MG
600 TABLET ORAL EVERY 6 HOURS
Refills: 0 | Status: COMPLETED | OUTPATIENT
Start: 2019-12-22 | End: 2020-11-19

## 2019-12-22 RX ORDER — DIBUCAINE 1 %
1 OINTMENT (GRAM) RECTAL EVERY 6 HOURS
Refills: 0 | Status: DISCONTINUED | OUTPATIENT
Start: 2019-12-22 | End: 2019-12-24

## 2019-12-22 RX ORDER — LANOLIN
1 OINTMENT (GRAM) TOPICAL EVERY 6 HOURS
Refills: 0 | Status: DISCONTINUED | OUTPATIENT
Start: 2019-12-22 | End: 2019-12-24

## 2019-12-22 RX ORDER — MAGNESIUM HYDROXIDE 400 MG/1
30 TABLET, CHEWABLE ORAL
Refills: 0 | Status: DISCONTINUED | OUTPATIENT
Start: 2019-12-22 | End: 2019-12-24

## 2019-12-22 RX ORDER — HYDROCORTISONE 1 %
1 OINTMENT (GRAM) TOPICAL EVERY 6 HOURS
Refills: 0 | Status: DISCONTINUED | OUTPATIENT
Start: 2019-12-22 | End: 2019-12-24

## 2019-12-22 RX ORDER — OXYTOCIN 10 UNIT/ML
333.33 VIAL (ML) INJECTION
Qty: 20 | Refills: 0 | Status: DISCONTINUED | OUTPATIENT
Start: 2019-12-22 | End: 2019-12-24

## 2019-12-22 RX ORDER — AMPICILLIN TRIHYDRATE 250 MG
1 CAPSULE ORAL EVERY 4 HOURS
Refills: 0 | Status: DISCONTINUED | OUTPATIENT
Start: 2019-12-22 | End: 2019-12-22

## 2019-12-22 RX ADMIN — Medication 975 MILLIGRAM(S): at 20:36

## 2019-12-22 RX ADMIN — SODIUM CHLORIDE 125 MILLILITER(S): 9 INJECTION, SOLUTION INTRAVENOUS at 04:25

## 2019-12-22 RX ADMIN — Medication 1 TABLET(S): at 13:32

## 2019-12-22 RX ADMIN — Medication 600 MILLIGRAM(S): at 23:41

## 2019-12-22 RX ADMIN — Medication 30 MILLIGRAM(S): at 07:50

## 2019-12-22 RX ADMIN — Medication 600 MILLIGRAM(S): at 17:14

## 2019-12-22 RX ADMIN — Medication 975 MILLIGRAM(S): at 13:29

## 2019-12-22 RX ADMIN — Medication 975 MILLIGRAM(S): at 14:00

## 2019-12-22 RX ADMIN — SODIUM CHLORIDE 125 MILLILITER(S): 9 INJECTION, SOLUTION INTRAVENOUS at 04:26

## 2019-12-22 RX ADMIN — Medication 216 GRAM(S): at 04:07

## 2019-12-22 RX ADMIN — Medication 975 MILLIGRAM(S): at 21:06

## 2019-12-22 RX ADMIN — Medication 600 MILLIGRAM(S): at 18:00

## 2019-12-22 NOTE — PATIENT PROFILE OB - BREAST MILK PROVIDES COLOSTRUM THAT IS HIGH IN PROTEIN
Statement Selected Dapsone Counseling: I discussed with the patient the risks of dapsone including but not limited to hemolytic anemia, agranulocytosis, rashes, methemoglobinemia, kidney failure, peripheral neuropathy, headaches, GI upset, and liver toxicity.  Patients who start dapsone require monitoring including baseline LFTs and weekly CBCs for the first month, then every month thereafter.  The patient verbalized understanding of the proper use and possible adverse effects of dapsone.  All of the patient's questions and concerns were addressed.

## 2019-12-23 ENCOUNTER — TRANSCRIPTION ENCOUNTER (OUTPATIENT)
Age: 24
End: 2019-12-23

## 2019-12-23 LAB
HCT VFR BLD CALC: 32.8 % — LOW (ref 34.5–45)
HGB BLD-MCNC: 10.6 G/DL — LOW (ref 11.5–15.5)
T PALLIDUM AB TITR SER: NEGATIVE — SIGNIFICANT CHANGE UP

## 2019-12-23 PROCEDURE — 76770 US EXAM ABDO BACK WALL COMP: CPT | Mod: 26

## 2019-12-23 RX ORDER — ACETAMINOPHEN 500 MG
3 TABLET ORAL
Qty: 0 | Refills: 0 | DISCHARGE
Start: 2019-12-23

## 2019-12-23 RX ORDER — IBUPROFEN 200 MG
1 TABLET ORAL
Qty: 0 | Refills: 0 | DISCHARGE
Start: 2019-12-23

## 2019-12-23 RX ADMIN — Medication 975 MILLIGRAM(S): at 03:24

## 2019-12-23 RX ADMIN — Medication 600 MILLIGRAM(S): at 13:20

## 2019-12-23 RX ADMIN — Medication 600 MILLIGRAM(S): at 06:41

## 2019-12-23 RX ADMIN — Medication 600 MILLIGRAM(S): at 00:11

## 2019-12-23 RX ADMIN — Medication 975 MILLIGRAM(S): at 22:45

## 2019-12-23 RX ADMIN — Medication 975 MILLIGRAM(S): at 22:00

## 2019-12-23 RX ADMIN — Medication 600 MILLIGRAM(S): at 19:00

## 2019-12-23 RX ADMIN — Medication 1 TABLET(S): at 12:55

## 2019-12-23 RX ADMIN — Medication 600 MILLIGRAM(S): at 06:11

## 2019-12-23 RX ADMIN — Medication 975 MILLIGRAM(S): at 09:45

## 2019-12-23 RX ADMIN — Medication 975 MILLIGRAM(S): at 16:45

## 2019-12-23 RX ADMIN — Medication 975 MILLIGRAM(S): at 09:13

## 2019-12-23 RX ADMIN — Medication 600 MILLIGRAM(S): at 18:32

## 2019-12-23 RX ADMIN — Medication 975 MILLIGRAM(S): at 16:16

## 2019-12-23 RX ADMIN — Medication 600 MILLIGRAM(S): at 12:54

## 2019-12-23 RX ADMIN — Medication 975 MILLIGRAM(S): at 03:54

## 2019-12-23 NOTE — DISCHARGE NOTE OB - MATERIALS PROVIDED
Breastfeeding Mother’s Support Group Information/[facility]/Health system  Screening Program/Health system Hearing Screen Program/Tdap Vaccination (VIS Pub Date: 2012)/Birth Certificate Instructions/Back To Sleep Handout/Shaken Baby Prevention Handout

## 2019-12-23 NOTE — PROGRESS NOTE ADULT - PROBLEM SELECTOR PLAN 1
- Pain well controlled, continue current pain regimen  - Continue ambulation, SCDs when not ambulating  - Continue regular diet  - f/u AM LEXIE Champagne, PGY-1 - Pain well controlled, continue current pain regimen  - Continue ambulation, SCDs when not ambulating  - Continue regular diet  - f/u AM CBC  - Renal US to be ordered this AM, f/u results    Ambika Champagne, PGY-1

## 2019-12-23 NOTE — DISCHARGE NOTE OB - CARE PLAN
Principal Discharge DX:	 (normal spontaneous vaginal delivery)  Goal:	recovery  Assessment and plan of treatment:	return to baseline health, regular diet, pain control, follow up with  Dr Villatoro

## 2019-12-23 NOTE — DISCHARGE NOTE OB - CARE PROVIDER_API CALL
Caridad Villatoro)  Obstetrics and Gynecology  [address], [address]  [place] [address]  Phone: [phone]  Fax: [phone]  Follow Up Time:

## 2019-12-23 NOTE — DISCHARGE NOTE OB - PATIENT PORTAL LINK FT
You can access the FollowMyHealth Patient Portal offered by [facility] by registering at the following website: [email or web address]. By joining [facility]’s FollowMyHealth portal, you will also be able to view your health information using other applications (apps) compatible with our system.

## 2019-12-23 NOTE — PROGRESS NOTE ADULT - SUBJECTIVE AND OBJECTIVE BOX
Postpartum Note- PPD#1      S:Patient w/o complaints, pain is controlled.    Pt is OOB, tolerating PO, voiding. Vaginal bleeding mild to moderate.       O:  Vital Signs Last 24 Hrs  T(C): 37.1 (23 Dec 2019 05:51), Max: 37.1 (23 Dec 2019 05:51)  T(F): 98.8 (23 Dec 2019 05:51), Max: 98.8 (23 Dec 2019 05:51)  HR: 69 (23 Dec 2019 05:51) (66 - 88)  BP: 93/60 (23 Dec 2019 05:51) (93/60 - 124/66)  BP(mean): --  RR: 18 (23 Dec 2019 05:51) (16 - 18)  SpO2: 99% (22 Dec 2019 21:20) (98% - 100%)         LABS:    Hemoglobin: 13.1 g/dL (12-22 @ 05:08)      Hematocrit: 39.8 % (12-22 @ 05:08)

## 2019-12-23 NOTE — PROGRESS NOTE ADULT - SUBJECTIVE AND OBJECTIVE BOX
OB Progress Note:  PPD#1    S: 23yo PPD#1 s/p . Patient feels well. Pain is well controlled. She is tolerating a regular diet and passing flatus. She is voiding spontaneously, and ambulating without difficulty. Endorses light vaginal bleeding, soaking less than 1 pad/hour. Denies CP/SOB. Denies lightheadedness/dizziness. Denies N/V.     O:  Vitals:  Vital Signs Last 24 Hrs  T(C): 36.6 (22 Dec 2019 21:20), Max: 36.9 (22 Dec 2019 07:10)  T(F): 97.9 (22 Dec 2019 21:20), Max: 98.4 (22 Dec 2019 07:10)  HR: 66 (22 Dec 2019 21:20) (66 - 114)  BP: 109/70 (22 Dec 2019 21:20) (105/63 - 124/66)  BP(mean): --  RR: 18 (22 Dec 2019 21:20) (16 - 20)  SpO2: 99% (22 Dec 2019 21:20) (98% - 100%)    MEDICATIONS  (STANDING):  acetaminophen   Tablet .. 975 milliGRAM(s) Oral <User Schedule>  diphtheria/tetanus/pertussis (acellular) Vaccine (ADAcel) 0.5 milliLiter(s) IntraMuscular once  ibuprofen  Tablet. 600 milliGRAM(s) Oral every 6 hours  oxytocin Infusion 333.333 milliUNIT(s)/Min (1000 mL/Hr) IV Continuous <Continuous>  oxytocin Infusion 333.333 milliUNIT(s)/Min (1000 mL/Hr) IV Continuous <Continuous>  prenatal multivitamin 1 Tablet(s) Oral daily  sodium chloride 0.9% lock flush 3 milliLiter(s) IV Push every 8 hours      Labs:  Blood type: O Positive  Rubella IgG: RPR: Negative                          13.1   14.68<H> >-----------< 351    (  @ 05:08 )             39.8                  Physical Exam:  General: NAD  Heart: all extremities well perfused  Lungs: breathing comfortably  Abdomen: soft, non-tender, non-distended, fundus firm  Vaginal: lochia wnl  Extremities: No calf tenderness or erythema OB Progress Note:  PPD#1    S: 25yo PPD#1 s/p  w/ PMH bilateral hydronephrosis during pregnancy. Patient feels well. Pain is well controlled. Denies bilateral flank pain. She is tolerating a regular diet and passing flatus. She is voiding spontaneously, and ambulating without difficulty. Endorses light vaginal bleeding, soaking less than 1 pad/hour. Denies CP/SOB. Denies lightheadedness/dizziness. Denies N/V.     O:  Vitals:  Vital Signs Last 24 Hrs  T(C): 36.6 (22 Dec 2019 21:20), Max: 36.9 (22 Dec 2019 07:10)  T(F): 97.9 (22 Dec 2019 21:20), Max: 98.4 (22 Dec 2019 07:10)  HR: 66 (22 Dec 2019 21:20) (66 - 114)  BP: 109/70 (22 Dec 2019 21:20) (105/63 - 124/66)  BP(mean): --  RR: 18 (22 Dec 2019 21:20) (16 - 20)  SpO2: 99% (22 Dec 2019 21:20) (98% - 100%)    MEDICATIONS  (STANDING):  acetaminophen   Tablet .. 975 milliGRAM(s) Oral <User Schedule>  diphtheria/tetanus/pertussis (acellular) Vaccine (ADAcel) 0.5 milliLiter(s) IntraMuscular once  ibuprofen  Tablet. 600 milliGRAM(s) Oral every 6 hours  oxytocin Infusion 333.333 milliUNIT(s)/Min (1000 mL/Hr) IV Continuous <Continuous>  oxytocin Infusion 333.333 milliUNIT(s)/Min (1000 mL/Hr) IV Continuous <Continuous>  prenatal multivitamin 1 Tablet(s) Oral daily  sodium chloride 0.9% lock flush 3 milliLiter(s) IV Push every 8 hours      Labs:  Blood type: O Positive  Rubella IgG: RPR: Negative                          13.1   14.68<H> >-----------< 351    (  @ 05:08 )             39.8                  Physical Exam:  General: NAD  Heart: all extremities well perfused  Lungs: breathing comfortably  Abdomen: soft, non-tender, non-distended, fundus firm  Vaginal: lochia wnl  Extremities: No calf tenderness or erythema

## 2019-12-23 NOTE — PROGRESS NOTE ADULT - ASSESSMENT
Per Dr. Villatoro-  Abdomen: Soft, nontender, non-distended, fundus firm.  Vaginal: Lochia WNL.   Ext: Neg edema, Neg calf tenderness

## 2019-12-24 ENCOUNTER — APPOINTMENT (OUTPATIENT)
Dept: ANTEPARTUM | Facility: CLINIC | Age: 24
End: 2019-12-24

## 2019-12-24 VITALS
RESPIRATION RATE: 18 BRPM | SYSTOLIC BLOOD PRESSURE: 99 MMHG | TEMPERATURE: 98 F | HEART RATE: 65 BPM | DIASTOLIC BLOOD PRESSURE: 60 MMHG

## 2019-12-24 PROCEDURE — 86850 RBC ANTIBODY SCREEN: CPT

## 2019-12-24 PROCEDURE — 86900 BLOOD TYPING SEROLOGIC ABO: CPT

## 2019-12-24 PROCEDURE — 86901 BLOOD TYPING SEROLOGIC RH(D): CPT

## 2019-12-24 PROCEDURE — 59050 FETAL MONITOR W/REPORT: CPT

## 2019-12-24 PROCEDURE — 59025 FETAL NON-STRESS TEST: CPT

## 2019-12-24 PROCEDURE — 90686 IIV4 VACC NO PRSV 0.5 ML IM: CPT

## 2019-12-24 PROCEDURE — 85014 HEMATOCRIT: CPT

## 2019-12-24 PROCEDURE — 85018 HEMOGLOBIN: CPT

## 2019-12-24 PROCEDURE — G0463: CPT

## 2019-12-24 PROCEDURE — 90715 TDAP VACCINE 7 YRS/> IM: CPT

## 2019-12-24 PROCEDURE — 76770 US EXAM ABDO BACK WALL COMP: CPT

## 2019-12-24 RX ORDER — TETANUS TOXOID, REDUCED DIPHTHERIA TOXOID AND ACELLULAR PERTUSSIS VACCINE, ADSORBED 5; 2.5; 8; 8; 2.5 [IU]/.5ML; [IU]/.5ML; UG/.5ML; UG/.5ML; UG/.5ML
0.5 SUSPENSION INTRAMUSCULAR ONCE
Refills: 0 | Status: COMPLETED | OUTPATIENT
Start: 2019-12-24 | End: 2019-12-24

## 2019-12-24 RX ORDER — INFLUENZA VIRUS VACCINE 15; 15; 15; 15 UG/.5ML; UG/.5ML; UG/.5ML; UG/.5ML
0.5 SUSPENSION INTRAMUSCULAR ONCE
Refills: 0 | Status: COMPLETED | OUTPATIENT
Start: 2019-12-24 | End: 2019-12-24

## 2019-12-24 RX ADMIN — Medication 975 MILLIGRAM(S): at 03:29

## 2019-12-24 RX ADMIN — TETANUS TOXOID, REDUCED DIPHTHERIA TOXOID AND ACELLULAR PERTUSSIS VACCINE, ADSORBED 0.5 MILLILITER(S): 5; 2.5; 8; 8; 2.5 SUSPENSION INTRAMUSCULAR at 11:57

## 2019-12-24 RX ADMIN — Medication 975 MILLIGRAM(S): at 04:15

## 2019-12-24 RX ADMIN — INFLUENZA VIRUS VACCINE 0.5 MILLILITER(S): 15; 15; 15; 15 SUSPENSION INTRAMUSCULAR at 11:58

## 2019-12-24 NOTE — PROGRESS NOTE ADULT - SUBJECTIVE AND OBJECTIVE BOX
Vital Signs Last 24 Hrs  T(C): 36.4 (23 Dec 2019 18:00), Max: 36.5 (23 Dec 2019 13:00)  T(F): 97.5 (23 Dec 2019 18:00), Max: 97.7 (23 Dec 2019 13:00)  HR: 83 (23 Dec 2019 18:00) (77 - 83)  BP: 100/61 (23 Dec 2019 18:00) (98/64 - 100/61)  BP(mean): --  RR: 18 (23 Dec 2019 18:00) (18 - 18)  SpO2: 98% (23 Dec 2019 18:00) (98% - 98%)    Abdomen soft  Fundus Firm  Lochia WNL  Voiding  Stable

## 2020-08-11 ENCOUNTER — RESULT REVIEW (OUTPATIENT)
Age: 25
End: 2020-08-11

## 2021-11-08 NOTE — DISCHARGE NOTE OB - AVOID SEXUAL ACTIVITY UNTIL YOUR POSTPARTUM VISIT
Physical Therapy     Referred by: Harvey Salvador PA-C; Medical Diagnosis (from order):    Diagnosis Information      Diagnosis    355.0 (ICD-9-CM) - G57.02 (ICD-10-CM) - Lesion of sciatic nerve, left lower limb    355.0 (ICD-9-CM) - G57.00 (ICD-10-CM) - Piriformis syndrome                Daily Treatment Note    Visit:  2   Patient alert and oriented X3.    SUBJECTIVE                                                                                                               11/1/2021.  Patient presents to PT with c/o L LS area, mid lumbar, L buttock pain, L posterior thigh pain, and lateral L leg pain.  Aggravating factors: transferring out of bed, walking a greater distance.  Relieving factors:  SI injection performed late Sept 2021, sitting.  Every month or two she has acute onset bilateral LE pain that occurs with twisting motions.  This pain can be severe.  Diagnostic testing:  LS MRI, MRI of the pelvis, MRI of the brain.  See note for results.  Onset:  3-4 months ago she started having these issues.  Insidious onset.  H/o R knee TKA.       11/8/2021.  Reports she feels that her symptoms have slightly worsened.  Feels like she may soon need another injection.    Pain / Symptoms:  Pain/symptom is: constant  Pain rating (out of 10): ; Best: 0; Worst: 2    OBJECTIVE                                                                                                                        TREATMENT                                                                                                                initial evaluation completed    Therapeutic Exercise:  Examination results were reviewed with the patient and the role of therapeutic exercise in resolving the function loss and pain was discussed.   See HEP section, instructed and performed.    11/8/2021.  Yellow band resisted hip abduction strengthening  Standing on 1/2 foam roll working on ankle controlled ROM  Standing calf stretch with goal of also stretching  one joint hip flexors          Manual Therapy:  STM to the anterior thigh in sitting.     Home Exercise Program/Education Materials: Access Code: [email or web address]  URL: [email or web address]  Date: 11/01/2021  Prepared by: Edgar Park    Exercises  · Seated Hip Abduction with Resistance - 3 x daily - 7 x weekly - 1 sets - 10 reps    Access Code: [email or web address]  URL: [email or web address]  Date: 11/08/2021  Prepared by: Edgar Park    Exercises  · Standing Gastroc Stretch - 5 x daily - 7 x weekly - 1 sets - 3 reps - 15 hold                     ASSESSMENT                                                                                                             11/1/2021.  The patient presents to physical therapy with the following deficits:  1.  OSWESTRY Score Calculated: 22.22 %  As a result of these deficits the patient's function level has also been impaired as manifest by their functional tool score (OSWESTRY Score Calculated: 22.22 %).  The patient will benefit from skilled PT intervention that will focus on patient education, therapeutic exercise, and manual therapy techniques.  Failure to intervene could lead to permanent function loss and could lead to increased chronicity of this disorder.      PROBLEM LIST:  11/8/2021  1.  OSWESTRY Score Calculated: 22.22 %  2.  Abnormal standing posture  Observation:   Cervical: forward head  Shoulder: rounded  Spine: increased thoracic kyphosis  Comments / Details: Stands with 12 degrees FB.  9 degrees on 11/8    Patient Education:   Results of above outlined education: Verbalizes understanding and Demonstrates understanding      PLAN                                                                                                                           Suggestions for next session as indicated: Assess hip flexor length  Strengthen hip extensors  MMT hip ABD, ER, IR, EXT  Lumbar ROM assessment      GOALS                                                                                                                            Long Term Goals: to be met by end of plan of care  1. Patient will ambulate 20 minutes no assistive device to improve community mobility to allow for shopping and other needed activities.    2. Patient will stand fully upright with no forward flexion to normalize SI joint rest position and allow for 2/10 pain maximum.     3. Patient will carry 10#  on level surface for completion of household tasks such as carrying groceries from her car into the home.    4. Oswestry: Patient will complete form to reflect an improved score to less than or equal to 10% to indicate patient reported improvement in function/disability/impairment (minimal detectable change: 12%).  5. Patient will be independent with progressed and modified home exercise program.      Therapy procedure time and total treatment time can be found documented on the Time Entry flowsheet   Statement Selected

## 2024-02-15 NOTE — DISCHARGE NOTE OB - MOOD SWINGS / DEPRESSION OR CRYING SPELLS LASTING MORE THAN 3 DAYS
Mom requested lab results from April's ED visit are sent to patients PCP for appointment today. Sent lab results to PCP via right fax at this time. Mom verbalized understanding.    Statement Selected

## 2025-01-28 RX ORDER — ONDANSETRON 8 MG/1
8 TABLET ORAL EVERY 8 HOURS
Qty: 90 | Refills: 0 | Status: ACTIVE | COMMUNITY
Start: 2025-01-28 | End: 1900-01-01